# Patient Record
Sex: FEMALE | Race: ASIAN | Employment: UNEMPLOYED | URBAN - METROPOLITAN AREA
[De-identification: names, ages, dates, MRNs, and addresses within clinical notes are randomized per-mention and may not be internally consistent; named-entity substitution may affect disease eponyms.]

---

## 2023-06-09 LAB
ANTIBODY SCREEN OB: NEGATIVE
HEPATITIS B SURFACE ANTIGEN OB: NEGATIVE
HIV RESULT OB: NEGATIVE
RAPID PLASMA REAGIN OB: NONREACTIVE
RH FACTOR OB: POSITIVE

## 2023-09-06 ENCOUNTER — OFFICE VISIT (OUTPATIENT)
Dept: PERINATAL CARE | Facility: HOSPITAL | Age: 38
End: 2023-09-06
Attending: OBSTETRICS & GYNECOLOGY
Payer: COMMERCIAL

## 2023-09-06 VITALS
BODY MASS INDEX: 24.66 KG/M2 | SYSTOLIC BLOOD PRESSURE: 113 MMHG | HEART RATE: 91 BPM | DIASTOLIC BLOOD PRESSURE: 69 MMHG | WEIGHT: 148 LBS | HEIGHT: 65 IN

## 2023-09-06 DIAGNOSIS — O09.529 AMA (ADVANCED MATERNAL AGE) MULTIGRAVIDA 35+: ICD-10-CM

## 2023-09-06 DIAGNOSIS — O09.522 MULTIGRAVIDA OF ADVANCED MATERNAL AGE IN SECOND TRIMESTER: Primary | ICD-10-CM

## 2023-09-06 PROCEDURE — 76811 OB US DETAILED SNGL FETUS: CPT | Performed by: OBSTETRICS & GYNECOLOGY

## 2023-09-06 RX ORDER — MELATONIN
325 EVERY OTHER DAY
COMMUNITY

## 2023-09-06 RX ORDER — CHOLECALCIFEROL (VITAMIN D3) 1250 MCG
CAPSULE ORAL
COMMUNITY

## 2023-09-06 RX ORDER — CHOLECALCIFEROL (VITAMIN D3) 25 MCG
1 TABLET,CHEWABLE ORAL DAILY
COMMUNITY

## 2023-11-29 LAB — HIV RESULT OB: NEGATIVE

## 2023-12-27 LAB — STREP GP B CULT OB: NEGATIVE

## 2024-01-24 ENCOUNTER — TELEPHONE (OUTPATIENT)
Dept: OBGYN UNIT | Facility: HOSPITAL | Age: 39
End: 2024-01-24

## 2024-01-26 ENCOUNTER — HOSPITAL ENCOUNTER (OUTPATIENT)
Facility: HOSPITAL | Age: 39
Discharge: HOME | End: 2024-01-26
Attending: STUDENT IN AN ORGANIZED HEALTH CARE EDUCATION/TRAINING PROGRAM | Admitting: STUDENT IN AN ORGANIZED HEALTH CARE EDUCATION/TRAINING PROGRAM
Payer: COMMERCIAL

## 2024-01-26 VITALS
SYSTOLIC BLOOD PRESSURE: 123 MMHG | WEIGHT: 177 LBS | RESPIRATION RATE: 16 BRPM | BODY MASS INDEX: 29.49 KG/M2 | HEART RATE: 80 BPM | TEMPERATURE: 98 F | HEIGHT: 65 IN | DIASTOLIC BLOOD PRESSURE: 68 MMHG

## 2024-01-26 PROCEDURE — 59025 FETAL NON-STRESS TEST: CPT

## 2024-01-26 PROCEDURE — 99214 OFFICE O/P EST MOD 30 MIN: CPT

## 2024-01-27 NOTE — NST
Nonstress Test   Patient: Angeli Murcia    Gestation: 40w3d    NST:       Variability: Moderate           Accelerations: Yes           Decelerations: None            Baseline: 130 BPM           Uterine Irritability: No           Contractions: Irregular                                        Acoustic Stimulator: No           Nonstress Test Interpretation: Reactive                                 Additional Comments      Physician Evaluation      NST Interpretation: Reactive    Disposition:   Discharged    Comments: Membranes intact. Vitals normal. Reactive fetal tracing.     Wendy Ray MD

## 2024-01-27 NOTE — PROGRESS NOTES
Discharge instructions given and explained, pt verbalizes understanding and agrees. Aware she is to return to L&D for scheduled IOL or sooner with labor sx. Pt ambulatory and discharged home accompanied by significant other.

## 2024-01-27 NOTE — PROGRESS NOTES
Pt is a 38 year old female admitted to TRG3/TRG3-A.     Chief Complaint   Patient presents with    R/o Rom      Pt is  40w3d intra-uterine pregnancy.  History obtained, consents signed. Oriented to room, staff, and plan of care. Pt c/o possible SROM at . States she leaked a small amount of clear fluid that leaked through her underwear onto her tights. Denies continued leaking. Denies regular ctx, vaginal bleeding.     EFM tested and applied, FHTs tracing and audible in 140s. Abdomen soft and non-tender.

## 2024-01-27 NOTE — DISCHARGE INSTRUCTIONS
Discharge Instructions    Diet: Regular  Activity: Normal activity         General Instructions    Call your OB doctor if: Fluid leaking from your vagina;Uterine contractions 10 minutes or closer for 1 to 2 hours;Uterine contractions increasing in intensity and frequency;Decrease in fetal movement;Vaginal bleeding;Temperature greater than 100F;Vaginal or rectal pressure  Early labor comfort measures: Drink fluids and eat small light meals;Relax, sleep, take a warm bath or shower for 30 minutes or less;Take a walk    Return to Labor and Delivery for scheduled induction of labor or sooner if symptoms of labor present

## 2024-01-29 ENCOUNTER — TELEPHONE (OUTPATIENT)
Dept: OBGYN UNIT | Facility: HOSPITAL | Age: 39
End: 2024-01-29

## 2024-01-30 ENCOUNTER — HOSPITAL ENCOUNTER (INPATIENT)
Facility: HOSPITAL | Age: 39
LOS: 1 days | Discharge: HOME | End: 2024-01-31
Attending: STUDENT IN AN ORGANIZED HEALTH CARE EDUCATION/TRAINING PROGRAM | Admitting: STUDENT IN AN ORGANIZED HEALTH CARE EDUCATION/TRAINING PROGRAM
Payer: COMMERCIAL

## 2024-01-30 ENCOUNTER — APPOINTMENT (OUTPATIENT)
Dept: OBGYN CLINIC | Facility: HOSPITAL | Age: 39
End: 2024-01-30
Payer: COMMERCIAL

## 2024-01-30 PROBLEM — Z34.90 PREGNANCY: Status: ACTIVE | Noted: 2024-01-30

## 2024-01-30 LAB
ANTIBODY SCREEN: POSITIVE
BASOPHILS # BLD AUTO: 0.02 X10(3) UL (ref 0–0.2)
BASOPHILS NFR BLD AUTO: 0.2 %
EOSINOPHIL # BLD AUTO: 0.1 X10(3) UL (ref 0–0.7)
EOSINOPHIL NFR BLD AUTO: 1.2 %
ERYTHROCYTE [DISTWIDTH] IN BLOOD BY AUTOMATED COUNT: 13.2 %
HCT VFR BLD AUTO: 38.1 %
HGB BLD-MCNC: 12.3 G/DL
IMM GRANULOCYTES # BLD AUTO: 0.03 X10(3) UL (ref 0–1)
IMM GRANULOCYTES NFR BLD: 0.4 %
LYMPHOCYTES # BLD AUTO: 1.66 X10(3) UL (ref 1–4)
LYMPHOCYTES NFR BLD AUTO: 19.6 %
MCH RBC QN AUTO: 28.9 PG (ref 26–34)
MCHC RBC AUTO-ENTMCNC: 32.3 G/DL (ref 31–37)
MCV RBC AUTO: 89.6 FL
MONOCYTES # BLD AUTO: 0.74 X10(3) UL (ref 0.1–1)
MONOCYTES NFR BLD AUTO: 8.7 %
NEUTROPHILS # BLD AUTO: 5.92 X10 (3) UL (ref 1.5–7.7)
NEUTROPHILS # BLD AUTO: 5.92 X10(3) UL (ref 1.5–7.7)
NEUTROPHILS NFR BLD AUTO: 69.9 %
PLATELET # BLD AUTO: 151 10(3)UL (ref 150–450)
RBC # BLD AUTO: 4.25 X10(6)UL
RGTSCRN: 2
RH BLOOD TYPE: POSITIVE
T PALLIDUM AB SER QL IA: NONREACTIVE
WBC # BLD AUTO: 8.5 X10(3) UL (ref 4–11)

## 2024-01-30 PROCEDURE — 86901 BLOOD TYPING SEROLOGIC RH(D): CPT | Performed by: STUDENT IN AN ORGANIZED HEALTH CARE EDUCATION/TRAINING PROGRAM

## 2024-01-30 PROCEDURE — 85025 COMPLETE CBC W/AUTO DIFF WBC: CPT | Performed by: STUDENT IN AN ORGANIZED HEALTH CARE EDUCATION/TRAINING PROGRAM

## 2024-01-30 PROCEDURE — 86850 RBC ANTIBODY SCREEN: CPT | Performed by: STUDENT IN AN ORGANIZED HEALTH CARE EDUCATION/TRAINING PROGRAM

## 2024-01-30 PROCEDURE — 86900 BLOOD TYPING SEROLOGIC ABO: CPT | Performed by: STUDENT IN AN ORGANIZED HEALTH CARE EDUCATION/TRAINING PROGRAM

## 2024-01-30 PROCEDURE — 86870 RBC ANTIBODY IDENTIFICATION: CPT | Performed by: STUDENT IN AN ORGANIZED HEALTH CARE EDUCATION/TRAINING PROGRAM

## 2024-01-30 PROCEDURE — 86780 TREPONEMA PALLIDUM: CPT | Performed by: STUDENT IN AN ORGANIZED HEALTH CARE EDUCATION/TRAINING PROGRAM

## 2024-01-30 PROCEDURE — 0UJD7ZZ INSPECTION OF UTERUS AND CERVIX, VIA NATURAL OR ARTIFICIAL OPENING: ICD-10-PCS | Performed by: OBSTETRICS & GYNECOLOGY

## 2024-01-30 PROCEDURE — 0KQM0ZZ REPAIR PERINEUM MUSCLE, OPEN APPROACH: ICD-10-PCS | Performed by: OBSTETRICS & GYNECOLOGY

## 2024-01-30 PROCEDURE — 86905 BLOOD TYPING RBC ANTIGENS: CPT | Performed by: STUDENT IN AN ORGANIZED HEALTH CARE EDUCATION/TRAINING PROGRAM

## 2024-01-30 RX ORDER — CITRIC ACID/SODIUM CITRATE 334-500MG
30 SOLUTION, ORAL ORAL AS NEEDED
Status: DISCONTINUED | OUTPATIENT
Start: 2024-01-30 | End: 2024-01-30 | Stop reason: HOSPADM

## 2024-01-30 RX ORDER — ACETAMINOPHEN 500 MG
1000 TABLET ORAL EVERY 6 HOURS PRN
Status: DISCONTINUED | OUTPATIENT
Start: 2024-01-30 | End: 2024-01-31

## 2024-01-30 RX ORDER — DEXTROSE, SODIUM CHLORIDE, SODIUM LACTATE, POTASSIUM CHLORIDE, AND CALCIUM CHLORIDE 5; .6; .31; .03; .02 G/100ML; G/100ML; G/100ML; G/100ML; G/100ML
INJECTION, SOLUTION INTRAVENOUS AS NEEDED
Status: DISCONTINUED | OUTPATIENT
Start: 2024-01-30 | End: 2024-01-30 | Stop reason: HOSPADM

## 2024-01-30 RX ORDER — DOCUSATE SODIUM 100 MG/1
100 CAPSULE, LIQUID FILLED ORAL
Status: DISCONTINUED | OUTPATIENT
Start: 2024-01-30 | End: 2024-01-31

## 2024-01-30 RX ORDER — TERBUTALINE SULFATE 1 MG/ML
0.25 INJECTION, SOLUTION SUBCUTANEOUS AS NEEDED
Status: DISCONTINUED | OUTPATIENT
Start: 2024-01-30 | End: 2024-01-30 | Stop reason: HOSPADM

## 2024-01-30 RX ORDER — IBUPROFEN 600 MG/1
600 TABLET ORAL ONCE AS NEEDED
Status: DISCONTINUED | OUTPATIENT
Start: 2024-01-30 | End: 2024-01-30 | Stop reason: HOSPADM

## 2024-01-30 RX ORDER — IBUPROFEN 600 MG/1
600 TABLET ORAL EVERY 6 HOURS
Status: DISCONTINUED | OUTPATIENT
Start: 2024-01-30 | End: 2024-01-31

## 2024-01-30 RX ORDER — CALCIUM CARBONATE 500 MG/1
1000 TABLET, CHEWABLE ORAL EVERY 4 HOURS PRN
Status: DISCONTINUED | OUTPATIENT
Start: 2024-01-30 | End: 2024-01-30 | Stop reason: HOSPADM

## 2024-01-30 RX ORDER — BISACODYL 10 MG
10 SUPPOSITORY, RECTAL RECTAL ONCE AS NEEDED
Status: DISCONTINUED | OUTPATIENT
Start: 2024-01-30 | End: 2024-01-31

## 2024-01-30 RX ORDER — ACETAMINOPHEN 500 MG
1000 TABLET ORAL EVERY 6 HOURS PRN
Status: DISCONTINUED | OUTPATIENT
Start: 2024-01-30 | End: 2024-01-30 | Stop reason: HOSPADM

## 2024-01-30 RX ORDER — ACETAMINOPHEN 500 MG
500 TABLET ORAL EVERY 6 HOURS PRN
Status: DISCONTINUED | OUTPATIENT
Start: 2024-01-30 | End: 2024-01-30 | Stop reason: HOSPADM

## 2024-01-30 RX ORDER — ONDANSETRON 2 MG/ML
4 INJECTION INTRAMUSCULAR; INTRAVENOUS EVERY 6 HOURS PRN
Status: DISCONTINUED | OUTPATIENT
Start: 2024-01-30 | End: 2024-01-30 | Stop reason: HOSPADM

## 2024-01-30 RX ORDER — SIMETHICONE 80 MG
80 TABLET,CHEWABLE ORAL 3 TIMES DAILY PRN
Status: DISCONTINUED | OUTPATIENT
Start: 2024-01-30 | End: 2024-01-31

## 2024-01-30 RX ORDER — SODIUM CHLORIDE, SODIUM LACTATE, POTASSIUM CHLORIDE, CALCIUM CHLORIDE 600; 310; 30; 20 MG/100ML; MG/100ML; MG/100ML; MG/100ML
INJECTION, SOLUTION INTRAVENOUS CONTINUOUS
Status: DISCONTINUED | OUTPATIENT
Start: 2024-01-30 | End: 2024-01-30 | Stop reason: HOSPADM

## 2024-01-30 RX ORDER — ACETAMINOPHEN 500 MG
500 TABLET ORAL EVERY 6 HOURS PRN
Status: DISCONTINUED | OUTPATIENT
Start: 2024-01-30 | End: 2024-01-31

## 2024-01-30 NOTE — H&P
[facility]    PATIENT'S NAME: MARINA RUVALCABA   ATTENDING PHYSICIAN: Wendy Ray MD   PATIENT ACCOUNT#:   [id number]    LOCATION:  [address] [place] [address]  MEDICAL RECORD #:   [id number]       YOB: 1985  ADMISSION DATE:       2024    HISTORY AND PHYSICAL EXAMINATION    HISTORY OF PRESENT ILLNESS:  She is a 38-year-old female.  She is a  4, para 3-0-0-3.  She has a due date of 2024, making her 41 weeks.  She came in early this morning with complaints of contractions that were uncomfortable, started around midnight, but they were quite spaced out.  Her cervix on admission was 4 and 80% and -2 cephalic presentation with a bag of water intact.  The patient had been offered induction as she is 41 weeks.  She had been offered induction previously; however, she declined to be induced.This baby was felt to be large and estimated to be around 9+ lbs at her EDC.   She has had previous large babies around the 9-pound range and states that she has had no complications or issues with these deliveries.  Her prenatal care has been with us and has been good.  Currently, she appears to be comfortable.  Lab work done earlier today reveals new antibodies that were not previously seen on her antibody screen, anti-E and anti-c antibody.  No titer is reported at this time.  I did speak with [facility], and they are aware and will provide a titer, but we will also notify the  pediatrician.    PRENATAL LABORATORY:  She had a group B strep on 2023 that was negative.  She also had blood work done earlier in the pregnancy, .  Her hemoglobin was 11.8, hematocrit was 37.7, platelets of 193.  She had HIV negative.  Hepatitis B surface antigen negative.  Rubella reactive.  Her blood type O positive with antibody screen negative and RPR negative.  Hepatitis C antibody nonreactive.  Her urinalysis was negative.  Her ferritin was normal.  Vitamin D was low at 15.9.  Parvo virus nonimmune.  These are  all from that same date.  Urine culture no growth.  She subsequently had other lab work done which was as follows.  She had October 19, 2023, a hemoglobin of 12.2, hematocrit of 36.6, and platelets of 203.  She had antibody screen negative again.  One-hour Glucola was normal, HIV was nonreactive and vitamin D was still low at 17.5, ferritin of 12.4.  She had on September 6 an AFP that was negative and normal.      PAST MEDICAL HISTORY:  Reviewed and is as follows.  She has history of vitamin D deficiency in her past medical history, anemia in pregnancy.     PAST SURGICAL HISTORY:  None.    MEDICATIONS:  Iron 1 tablet daily, prenatal vitamin once a day, vitamin D3 at 5000 international units daily once orally.    ALLERGIES:  None.     FAMILY HISTORY:  Stomach cancer in her grandfather on her dad's side.    REPRODUCTIVE HISTORY:  Menarche at age 16, every 28 days for 5 days.  Her prior gynecologic history, periods were heavy.  Pregnancies:  In 2016, at 41 weeks she had 8 hour labor with delivery of an 8 pound 5 ounce infant male vaginally, no complications.  In 2018, at 40 weeks, 6 hour labor, 9 pound infant female, vaginally, no complications.  In 2021, at 40 weeks she had a 5 hour labor, 9 pound 3 ounce male infant vaginally, no complications per patient.    SOCIAL HISTORY:  No smoking, no alcohol, no drug use.      Fetal status:     Her strip is reactive.  She is lexx every 2 to 6 minutes.  She appears in no discomfort.   PHYSICAL EXAM: Vital signs are stable and she is afebrile  GENERAL: She is alert and oriented.    LUNGS:  Nonlabored breathing.  ABDOMEN:  Gravid with estimated weight of about 9-1/2 pounds to 10 pounds.  PELVIC:  She is 5, 80%, and -2 with artificial rupture of membranes performed, very minimal fluid noted.  She has scant fluid seen.    EXTREMITIES:  Minimal edema.  No evidence of DVT.    ASSESSMENT:    1.   Intrauterine pregnancy at 41 weeks.  2.   Advanced maternal age.  3.   History of  large babies 9+ pounds.  Her Glucolas were normal., Suspected large infant  4.   Mild anemia.  5.   Vitamin D deficiency.  6.   Parvo nonimmune.  7.   Newly identified maternal antibodies, anti-E and anti-c noted.  No titer provided.     PLAN:  Anticipate .  Patient declines pain management, declines epidural.  I have spoken with [facility] regarding her newly identified antibodies, anti-E and anti-c.  We discussed possible need for blood in the event of an emergency, and the supervisor at the [facility] talked about providing her with O negative blood if needed and if the exact blood is not available and then O positive after that if needed given that is her blood type.  We will also notify pediatrician about the new antibodies present.    Dictated By Rossana Hardin M.D.  d: 2024 09:59:58  t: 2024 10:25:06  Job [id number]  LL/

## 2024-01-30 NOTE — L&D DELIVERY NOTE
[facility]    PATIENT'S NAME: MARINA RUVALCABA   ATTENDING PHYSICIAN: Wendy Ray MD   PATIENT ACCOUNT #: [id number] LOCATION:  [address] [place] [address]   MEDICAL RECORD #: [id number] YOB: 1985   ADMISSION DATE: 2024 DELIVERY DATE: 2024     DELIVERY NOTE    This is a 38-year-old female.  She is a  4, para 3-0-0-3, with a due date of 2024, making her 41 weeks.  This patient came in, labor, early this morning.  She was scheduled for induction today as well. The baby was suspected to be a large baby and she was offered an induction but desired to wait till 41 weeks. She had prior deliveries of  a 9 lb 3 oz infant without any issues per the patient.   She came in complaining of regular contractions that were uncomfortable, and on admission, she was checked and noted to be 4 cm, 80%, and -2 with an intact bag of water.  Her contractions were irregular,  5 to 7 minutes apart.  She was observed for a period of time.  The fetal status was reassuring on admission.  She was rechecked around 8 am in the the morning and noticed to be the same and had not made any progress, and was agreeable to starting some Pitocin.  Low-dose Pitocin was started, and then she underwent artificial rupture of membranes at approximately 9:30 a.m. with clear fluid.  She progressed slowly at that point, and then gradually her labor did progress and she was noted to be complete and felt a strong urge to push.  Of note is that the patient did not have an epidural and did not desire an epidural.  Once she was complete, she was encouraged to push, and she pushed for approximately 10 minutes with good support.  The baby's head was delivered, and upon delivery of the head, it was noted immediately that there was a shoulder dystocia, as the head retracted backward  and tightly on to the perineum.   Her  legs had been in Colette maneuver as this baby was anticipated to be a large baby.  Gentle traction was placed  on the head, and there was no movement of the shoulders.  At this point suprapubic pressure  was also applied by the RNs, and prior to doing that the shoulder dystocia code was called.  The team came in very rapidly. Since there was no resolution of the dystocia with  suprapubic pressure and gentle downward traction there was an  attempts to deliver the posterior shoulder.  This was not successful and  at this point, Dr. Lopez, who was the level 3 physician, came in to assist and was able to help rotate the baby with the posterior shoulder rotation ( pushing behind the anterior shoulder counterclockwise so the corkscrew maneuver was applied and this was successful to dislodge the anterior shoulder from under the pubic bone.  Then, the baby was able to be delivered at this point.  The entire process took approximately 2 minutes.  At this point, the cord was clamped and cut, and the baby was taken over to the warmer.  The  team had been called as well and came in for evaluation.  The neonatologist  was Dr. Tavares.  Cord gases were collected.  Cord blood was collected.  This baby was a male weighing 10 pounds 11 ounces with Apgars of 8 and 9.  At this point, the placenta delivered spontaneously completely intact.  Her uterus was gently explored and noted to have no residual fragments.  Her bleeding was within the normal range.  Her cervix was examined and noted to be intact.  She sustained a second-degree vaginal laceration which was repaired with 2-0 and 3-0 chromic in a standard manner, but she did receive local for her repair.  Her estimated blood loss was 250 mL.  All sponge and needle counts were correct.  Her rectum was checked and noted to be intact as well.  The baby had been examined by Neonatology and was noted to be doing well, moving both arms, and the plan is for routine postpartum care.  I did discuss the shoulder dystocia with the patient and her  in detail, and they verbalized  understanding of the delivery.  All questions were answered.  Baby Omar and mom doing well following the delivery.    Dictated By Rossana Hardin M.D.  d: 01/30/2024 13:34:34  t: 01/30/2024 17:28:27  Job [id number]  LL/

## 2024-01-30 NOTE — PLAN OF CARE
Problem: BIRTH - VAGINAL/ SECTION  Goal: Fetal and maternal status remain reassuring during the birth process  Description: INTERVENTIONS:  - Monitor vital signs  - Monitor fetal heart rate  - Monitor uterine activity  - Monitor labor progression (vaginal delivery)  - DVT prophylaxis (C/S delivery)  - Surgical antibiotic prophylaxis (C/S delivery)  Outcome: Progressing     Problem: PAIN - ADULT  Goal: Verbalizes/displays adequate comfort level or patient's stated pain goal  Description: INTERVENTIONS:  - Encourage pt to monitor pain and request assistance  - Assess pain using appropriate pain scale  - Administer analgesics based on type and severity of pain and evaluate response  - Implement non-pharmacological measures as appropriate and evaluate response  - Consider cultural and social influences on pain and pain management  - Manage/alleviate anxiety  - Utilize distraction and/or relaxation techniques  - Monitor for opioid side effects  - Notify MD/LIP if interventions unsuccessful or patient reports new pain  - Anticipate increased pain with activity and pre-medicate as appropriate  Outcome: Progressing     Problem: ANXIETY  Goal: Will report anxiety at manageable levels  Description: INTERVENTIONS:  - Administer medication as ordered  - Teach and rehearse alternative coping skills  - Provide emotional support with 1:1 interaction with staff  Outcome: Progressing     Problem: Patient/Family Goals  Goal: Patient/Family Long Term Goal  Description: Patient's Long Term Goal: To have a healthy and safe delivery    Interventions:  -   - See additional Care Plan goals for specific interventions  Outcome: Progressing  Goal: Patient/Family Short Term Goal  Description: Patient's Short Term Goal: Adequate pain relief    Interventions:   -   - See additional Care Plan goals for specific interventions  Outcome: Progressing

## 2024-01-30 NOTE — L&D DELIVERY NOTE
Rasheed Murcia [[id number]]      Labor Events     labor?: No   steroids?: None  Antibiotics received during labor?: No  Rupture date/time: 2024 0930     Rupture type: AROM  Fluid color: Clear  Labor type: Spontaneous Onset of Labor  Augmentation: AROM, Oxytocin  Indications for augmentation: Ineffective Contraction Pattern  Intrapartum & labor complications: Shoulder Dystocia       Labor Event Times    Labor onset date/time: 2024 0400  Dilation complete date/time: 2024 1240  Start pushing date/time: 2024 1241       [place] Presentation    Presentation: Vertex  Position: Left Occiput Anterior       Operative Delivery    Operative Vaginal Delivery: No                      Shoulder Dystocia    Shoulder Dystocia: Yes  Addtional staff called for assistance: Yes  Maneuver performed: Colette, Suprapubic pressure on fetal shoulder           Anesthesia    Method: None              [place] Delivery      Head delivery date/time: 2024 12:49:10   Delivery date/time:  24 12:51:17   Delivery type: Normal spontaneous vaginal delivery    Details:     Delivery location: delivery room       Delivery Providers    Delivering Clinician: Rossana Hardin MD, MD   Delivery personnel:  Provider Role   Dory Huertas, PATRICK Baby Nurse   Sue Packer RN Delivery Nurse   Twyla Lopez MD Obstetrician             Cord    Vessels: 3 Vessels  Complications: None  Timed cord clamping: No  Time in sec: 0  Cord blood disposition: to lab  Gases sent?: Yes       Resuscitation    Method: Oxygen        Measurements      Weight: 4860 g 10 lb 11.4 oz Length: 55.9 cm     Head circum.: 36 cm Chest circum.: 36 cm          Abdominal circum.: 34.5 cm           Placenta    Date/time: 2024 1255  Removal: Spontaneous  Appearance: Intact  Disposition: held for future pathology       Apgars    Living status: Living   Apgar Scoring Key:    0 1 2    Skin color Blue or pale Acrocyanotic Completely  pink    Heart rate Absent <100 bpm >100 bpm    Reflex irritability No response Grimace Cry or active withdrawal    Muscle tone Limp Some flexion Active motion    Respiratory effort Absent Weak cry; hypoventilation Good, crying              1 Minute:  5 Minute:  10 Minute:  15 Minute:  20 Minute:      Skin color: 0  1       Heart rate: 2  2       Reflex irritablity: 2  2       Muscle tone: 2  2       Respiratory effort: 2  2       Total: 8  9          Apgars assigned by: DR. GILBERT  [place] disposition: with mother       Skin to Skin    Skin to skin initiated date/time: 2024 1303  Skin to skin with: Mother       Vaginal Count    Initial count RN: Sue Packer RN   Sponges   Sharps    Initial counts 11   0    Final counts 11   1    Final count RN: Sue Packer RN  Final count MD: Rossana Hardin MD, MD       Delivery (Maternal)    Episiotomy: None  Perineal lacerations: 2nd Repaired?: Yes     Vaginal laceration?: No      Cervical laceration?: No    Clitoral laceration?: No                  .shoulder  [facility]    Shoulder Dystocia Delivery Documentation Tool    Angeli Murcia Patient Status:  Inpatient    1985 MRN [id number]   [facility] LABOR & DELIVERY Attending Wendy Ray MD   Hosp Day # 0 PCP No primary care provider on file.     Labor & Delivery  Date of Delivery: 2024   Delivery Type: Normal spontaneous vaginal delivery   Onset of Active Labor: 2024  4:00 AM     Onset of Second Stage: 12:40 PM   Time of Head Delivered: 2024 12:49 PM   Time of Body Delivered: 12:51 PM     Operative Vaginal Delivery:   Shoulder dystocia    Shoulder Dystocia:  Maneuvers attempted below with order. Duration: 2 minutes  .    Any/All maneuvers that apply and the order in which they were utilized. If the Maneuver was not used, it will not be checked and/or marked as N/A in the order selection. The order is not specified by the standard of care:     Maneuvers utilized In which Order    [x]Colette 1   [x]Suprapubic pressure 2   []Episiotomy    []Episiotomy extension    []Posterior arm release    [x]Morales's Maneuver (corkscrew from behind post shoulder) 3   []Woods Maneuver (corkscrew from in front of posterior shoulder)    []Other (list)               Verify that fundal pressure was not applied after the head delivered:  [x] Not Applied  [] Applied    Infant:    Infant Sex/Birthweight: male 10 lb 11.4 oz (4.86 kg)   Presentation Vertex [1]   Position Left [1]  Occiput [1]  Anterior [1]     Apgars:  1 minute: 8                5 minutes: 9                         10 minutes:      Neonatologist Present: yes  Pediatric Evalution Performed at time of delivery by:   Dr. Tavares - neonatologist  Infant moving bilateral arms spontaneously: yes    Placenta  Date/Time of Delivery: 1/30/2024 12:55 PM    Delivery: Spontaneous   Placenta Disposition: held for future pathology   Cord Gases Submitted: Yes   Cord Blood Collection: yes  Cord Tissue Collection: no  Cord Complications: None     Sponge and Needle Counts:  Verified yes    Maternal Anesthesia: None   Episiotomy/Laceration Repair  2 nd degree perineal laceration repaired with 2-0  and 3-0 chromic suture in the standard fashion.     Intake/Output Data  Quantitative Blood Loss (mL)    250cc    Additional Information:    Rossana Hardin MD   1/30/2024  1:20 PM

## 2024-01-30 NOTE — PROGRESS NOTES
On call Laborist 01/30/24    Called emergently to labor room after head had delivered and shoulder dystocia identified.    MD was attempting right posterior shoulder delivery that was unsuccessful.  I also attempted posterior shoulder delivery without success, then proceeded with Morales's maneuver, baby rotated from right arm posterior to anterior and shoulders delivered.  Remainder of baby delivered by attending obstetrician.  Cord clamped and cut, infant transferred to baby warmer.

## 2024-01-31 VITALS
TEMPERATURE: 98 F | HEIGHT: 65 IN | DIASTOLIC BLOOD PRESSURE: 56 MMHG | RESPIRATION RATE: 16 BRPM | SYSTOLIC BLOOD PRESSURE: 101 MMHG | BODY MASS INDEX: 29.49 KG/M2 | HEART RATE: 85 BPM | WEIGHT: 177 LBS

## 2024-01-31 PROBLEM — O09.522 MULTIGRAVIDA OF ADVANCED MATERNAL AGE IN SECOND TRIMESTER: Status: RESOLVED | Noted: 2023-09-06 | Resolved: 2024-01-31

## 2024-01-31 PROBLEM — Z34.90 PREGNANCY: Status: RESOLVED | Noted: 2024-01-30 | Resolved: 2024-01-31

## 2024-01-31 LAB
BASOPHILS # BLD AUTO: 0.03 X10(3) UL (ref 0–0.2)
BASOPHILS NFR BLD AUTO: 0.3 %
EOSINOPHIL # BLD AUTO: 0.06 X10(3) UL (ref 0–0.7)
EOSINOPHIL NFR BLD AUTO: 0.7 %
ERYTHROCYTE [DISTWIDTH] IN BLOOD BY AUTOMATED COUNT: 13.6 %
HCT VFR BLD AUTO: 31.2 %
HGB BLD-MCNC: 10.1 G/DL
IMM GRANULOCYTES # BLD AUTO: 0.04 X10(3) UL (ref 0–1)
IMM GRANULOCYTES NFR BLD: 0.4 %
LYMPHOCYTES # BLD AUTO: 1.57 X10(3) UL (ref 1–4)
LYMPHOCYTES NFR BLD AUTO: 17 %
MCH RBC QN AUTO: 29.4 PG (ref 26–34)
MCHC RBC AUTO-ENTMCNC: 32.4 G/DL (ref 31–37)
MCV RBC AUTO: 90.7 FL
MONOCYTES # BLD AUTO: 0.69 X10(3) UL (ref 0.1–1)
MONOCYTES NFR BLD AUTO: 7.5 %
NEUTROPHILS # BLD AUTO: 6.84 X10 (3) UL (ref 1.5–7.7)
NEUTROPHILS # BLD AUTO: 6.84 X10(3) UL (ref 1.5–7.7)
NEUTROPHILS NFR BLD AUTO: 74.1 %
PLATELET # BLD AUTO: 156 10(3)UL (ref 150–450)
RBC # BLD AUTO: 3.44 X10(6)UL
WBC # BLD AUTO: 9.2 X10(3) UL (ref 4–11)

## 2024-01-31 PROCEDURE — 85025 COMPLETE CBC W/AUTO DIFF WBC: CPT | Performed by: OBSTETRICS & GYNECOLOGY

## 2024-01-31 NOTE — DISCHARGE SUMMARY
Obstetrical Discharge Summary    Patient Name:  Angeli Murcia  MRN:                 [id number]  YOB: 1985    Primary OB Clinician: [facility]    Admission Date: 2024    Discharge Date:  2024    Admission Diagnosis: 37yo  at 41w0d with contractions     Discharge Diagnoses: 38 year old  s/p vaginal delivery    HPI:  The patient was scheduled for induction of labor on 24 but presented earlier that morning with complaints of contractions       Antepartum complications:   Post EDC  AMA  History of macrosomic infants  Newly identified maternal antibodies on admission    Intrapartum complications: shoulder dystocia    Delivery:  vaginal delivery  at 41w0d      Baby: Liveborn, Male    Hospital Course:   The patient was admitted to L&D for contractions. She did not make cervical change on her own, and pitocin was started per protocol followed by AROM. The patient progressed well through her labor. On 24 at 1251, pt underwent  vaginal delivery complicated by shoulder dystocia  with viable male infant. Apgars 8/9, weight 10 lb 11.4 oz. Please see delivery note for details.    Overall, pt did well in post-partum period. On postpartum day 1, patient was doing well. Pain was well-controlled. Lochia decreasing. She was tolerating a general diet. Vital signs were stable. Physical exam was within normal limits.  The patient continued to meet postpartum expectations. Pt was discharged home on post-partum day 1.    Recent Labs:  Recent Labs   Lab 24  0251 24  0818   RBC 4.25 3.44*   HGB 12.3 10.1*   HCT 38.1 31.2*   MCV 89.6 90.7   MCH 28.9 29.4   MCHC 32.3 32.4   RDW 13.2 13.6   NEPRELIM 5.92 6.84   WBC 8.5 9.2   .0 156.0           Discharge condition:  D/C'ed home in stable condition    Discharge diet:  general    Discharge medications:       Medication List        CONTINUE taking these medications      ferrous sulfate 325 (65 FE) MG Tbec     prenatal  vitamin with DHA 27-0.8-228 MG Caps     Vitamin D3 1.25 MG (56334 UT) Caps                Follow up: Follow-up in the office in 6 weeks.     Activity Restrictions:  Nothing per vagina for 6 weeks (no sex, tampons or douching)    Pt understood all instructions and was given copy on discharge home.     Wendy Ray MD

## 2024-01-31 NOTE — PROGRESS NOTES
[facility]  Post-Partum Vaginal Delivery Progress Note    Angeli Murcia Patient Status:  Inpatient    1985 MRN [id number]   Location [facility] 1SW-J Attending Wendy Ray MD   Hosp Day # 1 PCP No primary care provider on file.     SUBJECTIVE:    Postpartum Day 1 s/p vaginal delivery    The patient was sleeping quietly when I entered the room. Her nurse states that the patient has no complaints and desires discharge today.      OBJECTIVE:    Vital signs in last 24 hours:  Temp:  [97.9 °F (36.6 °C)-98.9 °F (37.2 °C)] 98 °F (36.7 °C)  Pulse:  [] 85  Resp:  [16] 16  BP: (101-119)/(54-90) 101/56        Data Reviewed:  Recent Labs   Lab 24  0251 24  0818   RBC 4.25 3.44*   HGB 12.3 10.1*   HCT 38.1 31.2*   MCV 89.6 90.7   MCH 28.9 29.4   MCHC 32.3 32.4   RDW 13.2 13.6   NEPRELIM 5.92 6.84   WBC 8.5 9.2   .0 156.0           ASSESSMENT:   Post Partum Day # 1 S/P Vaginal Delivery complicated by shoulder dystocia, Newly identified antibodies on admissions (anti-E and anti-c)    PLAN:  Routine Post partum care.  Continue prenatal vitamin containing iron daily after discharge  Discharge home, follow up in 6 weeks      Wendy Ray MD  2024  9:46 AM

## 2024-02-02 ENCOUNTER — TELEPHONE (OUTPATIENT)
Dept: OBGYN UNIT | Facility: HOSPITAL | Age: 39
End: 2024-02-02

## (undated) NOTE — LETTER
Dear New MomEstelle, we missed you! The nurses of [facility] have tried to reach you by phone to ask if you have any questions regarding your health or the health and care of your new little one.    We hope you are doing well. If, for any reason, you have questions or concerns about your health or your baby’s health, please contact your provider or your pediatrician or family medicine physician regarding your baby.     At [facility], we feel that postpartum support is very important for new families. Please see the enclosed new parent support flyer that lists support programs and resources with both in-person and online options.     Additionally, our Breastfeeding Centers at [facility] and [facility] in [place], offer outpatient visits with our International Board-Certified Lactation   Consultants (IBCLCs) for any breastfeeding concerns or questions you may have.    For issues related to stress, anxiety or depression, we have a Nurturing Mom support group that meets both in-person or online.  There’s also a 24-hour Mom’s Line where you can request a phone call from a clinical therapist for assistance for postpartum depression.    We encourage you to take advantage of these programs and resources as you recover from childbirth and learn to care for your new infant.    Best wishes,    [facility]            [id number]